# Patient Record
Sex: MALE | Race: OTHER | Employment: FULL TIME
[De-identification: names, ages, dates, MRNs, and addresses within clinical notes are randomized per-mention and may not be internally consistent; named-entity substitution may affect disease eponyms.]

---

## 2024-03-25 ENCOUNTER — APPOINTMENT (OUTPATIENT)
Facility: HOSPITAL | Age: 19
End: 2024-03-25

## 2024-03-25 ENCOUNTER — HOSPITAL ENCOUNTER (EMERGENCY)
Facility: HOSPITAL | Age: 19
Discharge: HOME | End: 2024-03-25
Attending: STUDENT IN AN ORGANIZED HEALTH CARE EDUCATION/TRAINING PROGRAM

## 2024-03-25 VITALS
HEART RATE: 79 BPM | TEMPERATURE: 98.2 F | SYSTOLIC BLOOD PRESSURE: 111 MMHG | DIASTOLIC BLOOD PRESSURE: 67 MMHG | RESPIRATION RATE: 16 BRPM | OXYGEN SATURATION: 100 %

## 2024-03-25 DIAGNOSIS — R11.2 NAUSEA VOMITING AND DIARRHEA: ICD-10-CM

## 2024-03-25 DIAGNOSIS — R19.7 NAUSEA VOMITING AND DIARRHEA: ICD-10-CM

## 2024-03-25 DIAGNOSIS — R10.11 ABDOMINAL PAIN, RIGHT UPPER QUADRANT: Primary | ICD-10-CM

## 2024-03-25 LAB
ALBUMIN SERPL-MCNC: 3.7 G/DL (ref 3.5–5)
ALBUMIN/GLOB SERPL: 0.9 (ref 1.1–2.2)
ALP SERPL-CCNC: 75 U/L (ref 45–117)
ALT SERPL-CCNC: 51 U/L (ref 12–78)
ANION GAP SERPL CALC-SCNC: 1 MMOL/L (ref 5–15)
APPEARANCE UR: CLEAR
AST SERPL-CCNC: 30 U/L (ref 15–37)
BACTERIA URNS QL MICRO: NEGATIVE /HPF
BASOPHILS # BLD: 0.1 K/UL (ref 0–0.1)
BASOPHILS NFR BLD: 3 % (ref 0–1)
BILIRUB SERPL-MCNC: 0.4 MG/DL (ref 0.2–1)
BILIRUB UR QL: NEGATIVE
BUN SERPL-MCNC: 14 MG/DL (ref 6–20)
BUN/CREAT SERPL: 18 (ref 12–20)
CALCIUM SERPL-MCNC: 8.3 MG/DL (ref 8.5–10.1)
CHLORIDE SERPL-SCNC: 111 MMOL/L (ref 97–108)
CO2 SERPL-SCNC: 26 MMOL/L (ref 21–32)
COLOR UR: ABNORMAL
CREAT SERPL-MCNC: 0.8 MG/DL (ref 0.7–1.3)
DIFFERENTIAL METHOD BLD: ABNORMAL
EOSINOPHIL # BLD: 0 K/UL (ref 0–0.4)
EOSINOPHIL NFR BLD: 1 % (ref 0–7)
EPITH CASTS URNS QL MICRO: ABNORMAL /LPF
ERYTHROCYTE [DISTWIDTH] IN BLOOD BY AUTOMATED COUNT: 14.3 % (ref 11.5–14.5)
GLOBULIN SER CALC-MCNC: 4.1 G/DL (ref 2–4)
GLUCOSE SERPL-MCNC: 93 MG/DL (ref 65–100)
GLUCOSE UR STRIP.AUTO-MCNC: NEGATIVE MG/DL
HCT VFR BLD AUTO: 42.8 % (ref 36.6–50.3)
HGB BLD-MCNC: 13.9 G/DL (ref 12.1–17)
HGB UR QL STRIP: ABNORMAL
HYALINE CASTS URNS QL MICRO: ABNORMAL /LPF (ref 0–2)
IMM GRANULOCYTES # BLD AUTO: 0 K/UL (ref 0–0.04)
IMM GRANULOCYTES NFR BLD AUTO: 0 % (ref 0–0.5)
KETONES UR QL STRIP.AUTO: NEGATIVE MG/DL
LEUKOCYTE ESTERASE UR QL STRIP.AUTO: NEGATIVE
LIPASE SERPL-CCNC: 20 U/L (ref 13–75)
LYMPHOCYTES # BLD: 1.7 K/UL (ref 0.8–3.5)
LYMPHOCYTES NFR BLD: 35 % (ref 12–49)
MCH RBC QN AUTO: 27.9 PG (ref 26–34)
MCHC RBC AUTO-ENTMCNC: 32.5 G/DL (ref 30–36.5)
MCV RBC AUTO: 85.9 FL (ref 80–99)
MONOCYTES # BLD: 0.3 K/UL (ref 0–1)
MONOCYTES NFR BLD: 6 % (ref 5–13)
NEUTS SEG # BLD: 2.7 K/UL (ref 1.8–8)
NEUTS SEG NFR BLD: 55 % (ref 32–75)
NITRITE UR QL STRIP.AUTO: NEGATIVE
NRBC # BLD: 0 K/UL (ref 0–0.01)
NRBC BLD-RTO: 0 PER 100 WBC
PH UR STRIP: 5.5 (ref 5–8)
PLATELET # BLD AUTO: 179 K/UL (ref 150–400)
PMV BLD AUTO: 12.2 FL (ref 8.9–12.9)
POTASSIUM SERPL-SCNC: 5.5 MMOL/L (ref 3.5–5.1)
PROT SERPL-MCNC: 7.8 G/DL (ref 6.4–8.2)
PROT UR STRIP-MCNC: NEGATIVE MG/DL
RBC # BLD AUTO: 4.98 M/UL (ref 4.1–5.7)
RBC #/AREA URNS HPF: ABNORMAL /HPF (ref 0–5)
RBC MORPH BLD: ABNORMAL
SODIUM SERPL-SCNC: 138 MMOL/L (ref 136–145)
SP GR UR REFRACTOMETRY: 1.03 (ref 1–1.03)
SPECIMEN HOLD: NORMAL
UROBILINOGEN UR QL STRIP.AUTO: 0.2 EU/DL (ref 0.2–1)
WBC # BLD AUTO: 4.8 K/UL (ref 4.1–11.1)
WBC MORPH BLD: ABNORMAL
WBC URNS QL MICRO: ABNORMAL /HPF (ref 0–4)

## 2024-03-25 PROCEDURE — 99284 EMERGENCY DEPT VISIT MOD MDM: CPT

## 2024-03-25 PROCEDURE — 96374 THER/PROPH/DIAG INJ IV PUSH: CPT

## 2024-03-25 PROCEDURE — 76705 ECHO EXAM OF ABDOMEN: CPT

## 2024-03-25 PROCEDURE — 6360000002 HC RX W HCPCS: Performed by: STUDENT IN AN ORGANIZED HEALTH CARE EDUCATION/TRAINING PROGRAM

## 2024-03-25 PROCEDURE — 85025 COMPLETE CBC W/AUTO DIFF WBC: CPT

## 2024-03-25 PROCEDURE — 81001 URINALYSIS AUTO W/SCOPE: CPT

## 2024-03-25 PROCEDURE — 96361 HYDRATE IV INFUSION ADD-ON: CPT

## 2024-03-25 PROCEDURE — 96375 TX/PRO/DX INJ NEW DRUG ADDON: CPT

## 2024-03-25 PROCEDURE — 80053 COMPREHEN METABOLIC PANEL: CPT

## 2024-03-25 PROCEDURE — 83690 ASSAY OF LIPASE: CPT

## 2024-03-25 PROCEDURE — 36415 COLL VENOUS BLD VENIPUNCTURE: CPT

## 2024-03-25 PROCEDURE — 2580000003 HC RX 258: Performed by: STUDENT IN AN ORGANIZED HEALTH CARE EDUCATION/TRAINING PROGRAM

## 2024-03-25 RX ORDER — KETOROLAC TROMETHAMINE 30 MG/ML
30 INJECTION, SOLUTION INTRAMUSCULAR; INTRAVENOUS ONCE
Status: COMPLETED | OUTPATIENT
Start: 2024-03-25 | End: 2024-03-25

## 2024-03-25 RX ORDER — 0.9 % SODIUM CHLORIDE 0.9 %
1000 INTRAVENOUS SOLUTION INTRAVENOUS ONCE
Status: COMPLETED | OUTPATIENT
Start: 2024-03-25 | End: 2024-03-25

## 2024-03-25 RX ORDER — ONDANSETRON 4 MG/1
4 TABLET, ORALLY DISINTEGRATING ORAL 3 TIMES DAILY PRN
Qty: 15 TABLET | Refills: 0 | Status: SHIPPED | OUTPATIENT
Start: 2024-03-25 | End: 2024-03-30

## 2024-03-25 RX ORDER — ONDANSETRON 2 MG/ML
4 INJECTION INTRAMUSCULAR; INTRAVENOUS ONCE
Status: COMPLETED | OUTPATIENT
Start: 2024-03-25 | End: 2024-03-25

## 2024-03-25 RX ADMIN — KETOROLAC TROMETHAMINE 30 MG: 30 INJECTION, SOLUTION INTRAMUSCULAR; INTRAVENOUS at 07:31

## 2024-03-25 RX ADMIN — SODIUM CHLORIDE 1000 ML: 9 INJECTION, SOLUTION INTRAVENOUS at 07:31

## 2024-03-25 RX ADMIN — ONDANSETRON 4 MG: 2 INJECTION INTRAMUSCULAR; INTRAVENOUS at 07:32

## 2024-03-25 ASSESSMENT — ENCOUNTER SYMPTOMS
ABDOMINAL PAIN: 1
DIARRHEA: 1
NAUSEA: 1
VOMITING: 1

## 2024-03-25 ASSESSMENT — PAIN DESCRIPTION - LOCATION: LOCATION: ABDOMEN

## 2024-03-25 ASSESSMENT — PAIN DESCRIPTION - ORIENTATION: ORIENTATION: LOWER

## 2024-03-25 ASSESSMENT — PAIN - FUNCTIONAL ASSESSMENT: PAIN_FUNCTIONAL_ASSESSMENT: 0-10

## 2024-03-25 ASSESSMENT — PAIN SCALES - GENERAL: PAINLEVEL_OUTOF10: 7

## 2024-03-25 NOTE — ED PROVIDER NOTES
injury    Amount and/or Complexity of Data Reviewed  Labs: ordered. Decision-making details documented in ED Course.  Radiology: ordered. Decision-making details documented in ED Course.    Risk  Prescription drug management.          FINAL IMPRESSION      1. Abdominal pain, right upper quadrant    2. Nausea vomiting and diarrhea          DISPOSITION/PLAN   DISPOSITION Decision To Discharge 03/25/2024 10:30:23 AM          (Please note that portions of this note were completed with a voice recognition program.  Efforts were made to edit the dictations but occasionally words are mis-transcribed.)    Juan Miguel Barrios DO (electronically signed)  Emergency Attending Physician               Juan Miguel Barrios DO  03/25/24 105

## 2024-03-25 NOTE — DISCHARGE INSTRUCTIONS
Nutrition for nausea, vomiting or diarrhea consists of foods that are bland and gentle on the stomach. If you have nausea or vomiting, it may be hard to hold down food. Some foods may even make your symptoms worse. If you are experiencing diarrhea, the diet suggested below may help solidify your stools.  You may have heard of the BRAT diet which stands for Bananas, Rice, Apples, and Toast. The BRAT diet was often recommended for nausea, vomiting and diarrhea, but is no longer because of how restrictive it is. Below are lists of foods to focus on and avoid when experiencing nausea, vomiting or diarrhea, but there are many more foods that can be included.  FOODS TO FOCUS ON   Soft fruits: bananas, applesauce, avocado, pumpkin, canned fruit (packed in water not heavy syrup), and melons  Steamed or boiled vegetables: carrots, green beans, potatoes, and squash   Low-fiber starches: white bread, white rice, saltine crackers, cream of wheat, instant oatmeal, and noodles  Unseasoned skinless baked chicken or turkey, scrambled eggs, yogurt and kefir  Drinks: bone broth, apple juice, coconut water, Pedialyte, weak tea  Homemade oral rehydration solution to prevent dehydration: 1 Liter of clean or boiled water, mix in 1/2 teaspoon of salt and 6 teaspoons of sugar and stir until both salt and sugar are completely dissolved. Cool down the water to room temperature or cooler before drinking.  FOODS TO AVOID  Avoid milk and dairy products for three days. Yogurt and kefir are okay   Avoid fried, fatty, greasy and spicy foods  Avoid pork, veal, salmon, and sardines  Avoid raw vegetables such as parsnips, beets, sauerkraut, corn on the cob, cabbage, broccoli, cauliflower, and onions  Avoid citrus fruits: pineapples, oranges, grapefruits, oniel and limes  Other fruits to avoid:  tomatoes, cherries, grapes, figs, raisins, and seeded berries  Avoid extremely hot or cold beverages  Avoid alcohol  Avoid coffee and caffeinated

## 2024-03-25 NOTE — ED TRIAGE NOTES
Patient arrived ambulatory via POV with c/c n/v/d x 5 days with poor appetite and pain across lower abdomen that started early this morning.